# Patient Record
(demographics unavailable — no encounter records)

---

## 2024-11-04 NOTE — HEALTH RISK ASSESSMENT
[Yes] : Yes [Monthly or less (1 pt)] : Monthly or less (1 point) [1 or 2 (0 pts)] : 1 or 2 (0 points) [Never (0 pts)] : Never (0 points) [No falls in past year] : Patient reported no falls in the past year [0] : 2) Feeling down, depressed, or hopeless: Not at all (0) [PHQ-2 Negative - No further assessment needed] : PHQ-2 Negative - No further assessment needed [Never] : Never [Patient reported colonoscopy was normal] : Patient reported colonoscopy was normal [Fully functional (bathing, dressing, toileting, transferring, walking, feeding)] : Fully functional (bathing, dressing, toileting, transferring, walking, feeding) [Fully functional (using the telephone, shopping, preparing meals, housekeeping, doing laundry, using] : Fully functional and needs no help or supervision to perform IADLs (using the telephone, shopping, preparing meals, housekeeping, doing laundry, using transportation, managing medications and managing finances) [Good] : ~his/her~  mood as  good [None] : None [With Family] : lives with family [Retired] : retired [Feels Safe at Home] : Feels safe at home [de-identified] : dermatology [Audit-CScore] : 1 [de-identified] : walking, lifting light weights [de-identified] : regular [[id number]] : 0 [Reports changes in hearing] : Reports no changes in hearing [Reports changes in vision] : Reports no changes in vision [ColonoscopyDate] : 01/12 [de-identified] : wears hearing aids

## 2024-11-04 NOTE — PLAN
[FreeTextEntry1] : Patient is an 83 yo M with HTN, prediabetes, small umbilical hernia, presenting for an annual physical. Currently denies any headaches, chest pain, shortness of breath, nausea/vomiting, diarrhea, constipation. 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 He enjoys walking daily. Taking blood pressure medication as prescribed. He follows with dermatology regularly.  HTN - BP at goal, continue amlodipine 5mg daily Prediabetes - labs drawn in office, weight and exercise discussed Umbilical hernia - stable, no clinical discomfort Continue routine follow up with dermatology, Dr. Chan All questions answered Flu shot given today Covid vaccine recommended Labs drawn in office 6 month follow up for BP

## 2024-11-04 NOTE — ADDENDUM
[FreeTextEntry1] : Patient present for influenza vaccination as ordered by  Prior to administration, reviewed influenza VIS with patient who verbalized understanding and consent.   Pt denies previous allergic reactions and Guillain [place] syndrome.   MANU: [employer] NDC [id number] LOT: [id number] EXP:06/01/2025  Patient tolerated vaccination well in right deltoid. No immediate adverse reaction noted.   VIS sent home with patient as per protocol.   Maile Mcmahon RN

## 2024-11-04 NOTE — PHYSICAL EXAM
[No Acute Distress] : no acute distress [Well Nourished] : well nourished [Well Developed] : well developed [Well-Appearing] : well-appearing [Normal Sclera/Conjunctiva] : normal sclera/conjunctiva [PERRL] : pupils equal round and reactive to light [EOMI] : extraocular movements intact [Normal Outer Ear/Nose] : the outer ears and nose were normal in appearance [Normal Oropharynx] : the oropharynx was normal [No JVD] : no jugular venous distention [No Lymphadenopathy] : no lymphadenopathy [Supple] : supple [Thyroid Normal, No Nodules] : the thyroid was normal and there were no nodules present [No Respiratory Distress] : no respiratory distress  [No Accessory Muscle Use] : no accessory muscle use [Clear to Auscultation] : lungs were clear to auscultation bilaterally [Normal Rate] : normal rate  [Regular Rhythm] : with a regular rhythm [Normal S1, S2] : normal S1 and S2 [No Murmur] : no murmur heard [No Carotid Bruits] : no carotid bruits [No Abdominal Bruit] : a ~M bruit was not heard ~T in the abdomen [No Varicosities] : no varicosities [Pedal Pulses Present] : the pedal pulses are present [No Edema] : there was no peripheral edema [No Palpable Aorta] : no palpable aorta [No Extremity Clubbing/Cyanosis] : no extremity clubbing/cyanosis [Soft] : abdomen soft [Non Tender] : non-tender [Non-distended] : non-distended [No Masses] : no abdominal mass palpated [No HSM] : no HSM [Normal Bowel Sounds] : normal bowel sounds [Normal Posterior Cervical Nodes] : no posterior cervical lymphadenopathy [Normal Anterior Cervical Nodes] : no anterior cervical lymphadenopathy [No CVA Tenderness] : no CVA  tenderness [No Spinal Tenderness] : no spinal tenderness [No Joint Swelling] : no joint swelling [Grossly Normal Strength/Tone] : grossly normal strength/tone [No Rash] : no rash [Coordination Grossly Intact] : coordination grossly intact [No Focal Deficits] : no focal deficits [Normal Gait] : normal gait [Deep Tendon Reflexes (DTR)] : deep tendon reflexes were 2+ and symmetric [Normal Affect] : the affect was normal [Normal Insight/Judgement] : insight and judgment were intact [de-identified] : wears hearing aids [de-identified] : umbilical hernia

## 2024-11-04 NOTE — HISTORY OF PRESENT ILLNESS
[FreeTextEntry1] : Annual [de-identified] : Patient is an 81 yo M with HTN, prediabetes, small umbilical hernia, presenting for an annual physical. Currently denies any headaches, chest pain, shortness of breath, nausea/vomiting, diarrhea, constipation. 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 He enjoys walking daily. Taking blood pressure medication as prescribed. He follows with dermatology regularly.

## 2025-05-09 NOTE — PLAN
[FreeTextEntry1] : Patient is an 82 yo M with HTN, prediabetes, small umbilical hernia, presenting for BP follow up. Reports he ran out of medications and did not reach out for a refill for the last month. Currently denies any headaches, chest pain, shortness of breath, nausea/vomiting, diarrhea, constipation. Amlodipine refilled for patient today.  Labs drawn in office for prediabetes, CMET for HTN.  Stressed importance of not running out of medications, patient confirmed. Noted mild increase in BP since stopped taking amlodipine, needs stricter control All questions answered Annual physical in Nov 2025.

## 2025-05-09 NOTE — HEALTH RISK ASSESSMENT
[Yes] : Yes [Monthly or less (1 pt)] : Monthly or less (1 point) [1 or 2 (0 pts)] : 1 or 2 (0 points) [Never (0 pts)] : Never (0 points) [No falls in past year] : Patient reported no falls in the past year [0] : 2) Feeling down, depressed, or hopeless: Not at all (0) [PHQ-2 Negative - No further assessment needed] : PHQ-2 Negative - No further assessment needed [Never] : Never [Audit-CScore] : 1 [[id number]] : 0

## 2025-05-09 NOTE — HEALTH RISK ASSESSMENT
[Yes] : Yes [Monthly or less (1 pt)] : Monthly or less (1 point) [1 or 2 (0 pts)] : 1 or 2 (0 points) [Never (0 pts)] : Never (0 points) [No falls in past year] : Patient reported no falls in the past year [0] : 2) Feeling down, depressed, or hopeless: Not at all (0) [PHQ-2 Negative - No further assessment needed] : PHQ-2 Negative - No further assessment needed [Never] : Never [Audit-CScore] : 1 [[id number]] : 0

## 2025-05-09 NOTE — HISTORY OF PRESENT ILLNESS
[FreeTextEntry1] : follow up [de-identified] : Patient is an 84 yo M with HTN, prediabetes, small umbilical hernia, presenting for BP follow up. Reports he ran out of medications and did not reach out for a refill for the last month. Currently denies any headaches, chest pain, shortness of breath, nausea/vomiting, diarrhea, constipation.

## 2025-05-09 NOTE — HISTORY OF PRESENT ILLNESS
[FreeTextEntry1] : follow up [de-identified] : Patient is an 84 yo M with HTN, prediabetes, small umbilical hernia, presenting for BP follow up. Reports he ran out of medications and did not reach out for a refill for the last month. Currently denies any headaches, chest pain, shortness of breath, nausea/vomiting, diarrhea, constipation.

## 2025-05-09 NOTE — PLAN
[FreeTextEntry1] : Patient is an 84 yo M with HTN, prediabetes, small umbilical hernia, presenting for BP follow up. Reports he ran out of medications and did not reach out for a refill for the last month. Currently denies any headaches, chest pain, shortness of breath, nausea/vomiting, diarrhea, constipation. Amlodipine refilled for patient today.  Labs drawn in office for prediabetes, CMET for HTN.  Stressed importance of not running out of medications, patient confirmed. Noted mild increase in BP since stopped taking amlodipine, needs stricter control All questions answered Annual physical in Nov 2025.